# Patient Record
Sex: MALE | Race: WHITE | NOT HISPANIC OR LATINO | Employment: FULL TIME | URBAN - NONMETROPOLITAN AREA
[De-identification: names, ages, dates, MRNs, and addresses within clinical notes are randomized per-mention and may not be internally consistent; named-entity substitution may affect disease eponyms.]

---

## 2023-03-12 ENCOUNTER — HOSPITAL ENCOUNTER (EMERGENCY)
Facility: HOSPITAL | Age: 23
Discharge: HOME | End: 2023-03-12
Attending: EMERGENCY MEDICINE | Admitting: EMERGENCY MEDICINE
Payer: COMMERCIAL

## 2023-03-12 ENCOUNTER — APPOINTMENT (OUTPATIENT)
Dept: GENERAL RADIOLOGY | Facility: HOSPITAL | Age: 23
End: 2023-03-12
Payer: COMMERCIAL

## 2023-03-12 VITALS
BODY MASS INDEX: 25.18 KG/M2 | DIASTOLIC BLOOD PRESSURE: 76 MMHG | OXYGEN SATURATION: 99 % | HEART RATE: 88 BPM | TEMPERATURE: 97.4 F | RESPIRATION RATE: 16 BRPM | SYSTOLIC BLOOD PRESSURE: 123 MMHG | HEIGHT: 69 IN | WEIGHT: 170 LBS

## 2023-03-12 DIAGNOSIS — S50.12XA CONTUSION OF LEFT FOREARM, INITIAL ENCOUNTER: Primary | ICD-10-CM

## 2023-03-12 PROCEDURE — 99282 EMERGENCY DEPT VISIT SF MDM: CPT

## 2023-03-12 PROCEDURE — 73090 X-RAY EXAM OF FOREARM: CPT

## 2023-03-12 RX ORDER — ESCITALOPRAM OXALATE 10 MG/1
10 TABLET ORAL DAILY
COMMUNITY

## 2023-03-12 NOTE — ED PROVIDER NOTES
"Subjective  History of Present Illness:    Chief Complaint: Left forearm pain,  History of Present Illness: 23-year-old male presents with above complaint, states he was working on a vehicle at home yesterday when the hay dropped and the vehicle impacted his left arm and right lateral shoulder.  States he is mainly concerned about the left forearm.  No punctures no lacerations.    Nurses Notes reviewed and agree, including vitals, allergies, social history and prior medical history.     REVIEW OF SYSTEMS: All systems reviewed and not pertinent unless noted.  Review of Systems      Positive for: Left forearm pain, shoulder injury after a hay dropped while he was working on a vehicle    Negative for: Punctures lacerations deformity chest pain head injury neck pain back pain other areas of injury    History reviewed. No pertinent past medical history.    Allergies:    Patient has no known allergies.      Past Surgical History:   Procedure Laterality Date   • HAND SURGERY      Boxers fracture         Social History     Socioeconomic History   • Marital status: Single   Tobacco Use   • Smoking status: Never   Substance and Sexual Activity   • Alcohol use: No   • Drug use: No         History reviewed. No pertinent family history.    Objective  Physical Exam:  /76 (BP Location: Left arm, Patient Position: Sitting)   Pulse 88   Temp 97.4 °F (36.3 °C) (Oral)   Resp 16   Ht 175.3 cm (69\")   Wt 77.1 kg (170 lb)   SpO2 99%   BMI 25.10 kg/m²      Physical Exam    CONSTITUTIONAL: Well developed, nontoxic healthy-appearing 23-year-old male,  in no acute distress.  VITAL SIGNS: per nursing, reviewed and noted  SKIN: exposed skin with no rashes, ulcerations or petechiae  EYES: Grossly EOMI, no icterus  ENT: Normal voice.  No oropharyngeal injury RESPIRATORY:  No increased work of breathing. No retractions.   CARDIOVASCULAR:  regular rate and rhythm, no murmurs.  Good Peripheral pulses. Good cap refill to extremities. "   MUSCULOSKELETAL: Age-appropriate bulk and tone, moves all 4 extremities.  Mild soft tissue swelling over the dorsal aspect of the mid left forearm.  No punctures or lacerations there.  Superficial 3 cm abrasion over the right deltoid transverse oriented  NEUROLOGIC: Alert, oriented x 3. No gross deficits. GCS 15.   PSYCH: appropriate affect.    Procedures  No attending physician procedures were performed on this patient.  ED Course:             Lab Results (last 24 hours)     ** No results found for the last 24 hours. **           XR Forearm 2 View Left    Result Date: 3/12/2023  PROCEDURE: XR FOREARM 2 VW LEFT-  HISTORY: Crush injury mid forearm  FINDINGS:  Two views show no evidence of acute fracture or dislocation. The joint spaces appear normal. There is posterior soft tissue swelling. No foreign body is identified.      Impression: No acute bony abnormality identified.  Posterior soft tissue swelling.      This report was signed and finalized on 3/12/2023 9:54 AM by Fortunato Perez MD.         MDM    Initial impression of presenting illness: 23-year-old male presents with blunt injury, primarily left forearm pain    DDX: includes but is not limited to: Sprain strain contusion fracture    Patient arrives normotensive afebrile no tachycardia oxygen saturations 99% room air with vitals interpreted by myself.     Pertinent features from physical exam: Dorsal left forearm soft tissue swelling,.  With mild tenderness without otherwise any deformity.  Superficial abrasion over the right deltoid  Initial diagnostic plan: Plain films of the left forearm declined right shoulder x-ray    Results from initial plan were reviewed and interpreted by me revealing no acute fractures, radiologist agreed    Diagnostic information from other sources:      Interventions / Re-evaluation: None    Results/clinical rationale were discussed with patient    Consultations/Discussion of results with other physicians: None    Disposition  plan: Patient was discharged in home stable condition.  Counseled on supportive care, outpatient follow-up. Return precaution discussed.  Patient/family was understanding and agreeable with plan    -----       Final diagnoses:   Contusion of left forearm, initial encounter        Andrea Dutton,   03/12/23 1003

## 2025-03-01 ENCOUNTER — HOSPITAL ENCOUNTER (EMERGENCY)
Facility: HOSPITAL | Age: 25
Discharge: OTHER INSTITUTION | End: 2025-03-02
Attending: STUDENT IN AN ORGANIZED HEALTH CARE EDUCATION/TRAINING PROGRAM
Payer: COMMERCIAL

## 2025-03-01 ENCOUNTER — APPOINTMENT (OUTPATIENT)
Dept: CT IMAGING | Facility: HOSPITAL | Age: 25
End: 2025-03-01
Payer: COMMERCIAL

## 2025-03-01 DIAGNOSIS — K04.7 DENTAL ABSCESS: Primary | ICD-10-CM

## 2025-03-01 DIAGNOSIS — K12.2 ABSCESS OF SUBMANDIBULAR REGION: ICD-10-CM

## 2025-03-01 LAB
ALBUMIN SERPL-MCNC: 4.4 G/DL (ref 3.5–5.2)
ALBUMIN/GLOB SERPL: 1.4 G/DL
ALP SERPL-CCNC: 81 U/L (ref 39–117)
ALT SERPL W P-5'-P-CCNC: 10 U/L (ref 1–41)
ANION GAP SERPL CALCULATED.3IONS-SCNC: 16.9 MMOL/L (ref 5–15)
AST SERPL-CCNC: 15 U/L (ref 1–40)
BASOPHILS # BLD AUTO: 0.03 10*3/MM3 (ref 0–0.2)
BASOPHILS NFR BLD AUTO: 0.2 % (ref 0–1.5)
BILIRUB SERPL-MCNC: 0.4 MG/DL (ref 0–1.2)
BUN SERPL-MCNC: 6 MG/DL (ref 6–20)
BUN/CREAT SERPL: 7.3 (ref 7–25)
CALCIUM SPEC-SCNC: 9.4 MG/DL (ref 8.6–10.5)
CHLORIDE SERPL-SCNC: 98 MMOL/L (ref 98–107)
CO2 SERPL-SCNC: 21.1 MMOL/L (ref 22–29)
CREAT SERPL-MCNC: 0.82 MG/DL (ref 0.76–1.27)
CRP SERPL-MCNC: 12.48 MG/DL (ref 0–0.5)
DEPRECATED RDW RBC AUTO: 37.2 FL (ref 37–54)
EGFRCR SERPLBLD CKD-EPI 2021: 124.2 ML/MIN/1.73
EOSINOPHIL # BLD AUTO: 0.09 10*3/MM3 (ref 0–0.4)
EOSINOPHIL NFR BLD AUTO: 0.7 % (ref 0.3–6.2)
ERYTHROCYTE [DISTWIDTH] IN BLOOD BY AUTOMATED COUNT: 12.5 % (ref 12.3–15.4)
ERYTHROCYTE [SEDIMENTATION RATE] IN BLOOD: 42 MM/HR (ref 0–15)
GLOBULIN UR ELPH-MCNC: 3.1 GM/DL
GLUCOSE SERPL-MCNC: 76 MG/DL (ref 65–99)
HCT VFR BLD AUTO: 41.5 % (ref 37.5–51)
HGB BLD-MCNC: 14.3 G/DL (ref 13–17.7)
IMM GRANULOCYTES # BLD AUTO: 0.05 10*3/MM3 (ref 0–0.05)
IMM GRANULOCYTES NFR BLD AUTO: 0.4 % (ref 0–0.5)
LYMPHOCYTES # BLD AUTO: 1.9 10*3/MM3 (ref 0.7–3.1)
LYMPHOCYTES NFR BLD AUTO: 15 % (ref 19.6–45.3)
MCH RBC QN AUTO: 28 PG (ref 26.6–33)
MCHC RBC AUTO-ENTMCNC: 34.5 G/DL (ref 31.5–35.7)
MCV RBC AUTO: 81.4 FL (ref 79–97)
MONOCYTES # BLD AUTO: 1.3 10*3/MM3 (ref 0.1–0.9)
MONOCYTES NFR BLD AUTO: 10.2 % (ref 5–12)
NEUTROPHILS NFR BLD AUTO: 73.5 % (ref 42.7–76)
NEUTROPHILS NFR BLD AUTO: 9.33 10*3/MM3 (ref 1.7–7)
NRBC BLD AUTO-RTO: 0 /100 WBC (ref 0–0.2)
PLATELET # BLD AUTO: 316 10*3/MM3 (ref 140–450)
PMV BLD AUTO: 10 FL (ref 6–12)
POTASSIUM SERPL-SCNC: 3.5 MMOL/L (ref 3.5–5.2)
PROT SERPL-MCNC: 7.5 G/DL (ref 6–8.5)
RBC # BLD AUTO: 5.1 10*6/MM3 (ref 4.14–5.8)
SODIUM SERPL-SCNC: 136 MMOL/L (ref 136–145)
WBC NRBC COR # BLD AUTO: 12.7 10*3/MM3 (ref 3.4–10.8)

## 2025-03-01 PROCEDURE — 80053 COMPREHEN METABOLIC PANEL: CPT | Performed by: STUDENT IN AN ORGANIZED HEALTH CARE EDUCATION/TRAINING PROGRAM

## 2025-03-01 PROCEDURE — 85025 COMPLETE CBC W/AUTO DIFF WBC: CPT | Performed by: STUDENT IN AN ORGANIZED HEALTH CARE EDUCATION/TRAINING PROGRAM

## 2025-03-01 PROCEDURE — 86140 C-REACTIVE PROTEIN: CPT | Performed by: STUDENT IN AN ORGANIZED HEALTH CARE EDUCATION/TRAINING PROGRAM

## 2025-03-01 PROCEDURE — 70487 CT MAXILLOFACIAL W/DYE: CPT

## 2025-03-01 PROCEDURE — 85652 RBC SED RATE AUTOMATED: CPT | Performed by: STUDENT IN AN ORGANIZED HEALTH CARE EDUCATION/TRAINING PROGRAM

## 2025-03-01 PROCEDURE — 25510000001 IOPAMIDOL 61 % SOLUTION: Performed by: STUDENT IN AN ORGANIZED HEALTH CARE EDUCATION/TRAINING PROGRAM

## 2025-03-01 PROCEDURE — 99285 EMERGENCY DEPT VISIT HI MDM: CPT | Performed by: STUDENT IN AN ORGANIZED HEALTH CARE EDUCATION/TRAINING PROGRAM

## 2025-03-01 RX ORDER — IOPAMIDOL 612 MG/ML
100 INJECTION, SOLUTION INTRAVASCULAR
Status: COMPLETED | OUTPATIENT
Start: 2025-03-01 | End: 2025-03-01

## 2025-03-01 RX ORDER — SODIUM CHLORIDE 0.9 % (FLUSH) 0.9 %
10 SYRINGE (ML) INJECTION AS NEEDED
Status: DISCONTINUED | OUTPATIENT
Start: 2025-03-01 | End: 2025-03-02 | Stop reason: HOSPADM

## 2025-03-01 RX ORDER — CLINDAMYCIN PHOSPHATE 600 MG/50ML
600 INJECTION, SOLUTION INTRAVENOUS ONCE
Status: COMPLETED | OUTPATIENT
Start: 2025-03-02 | End: 2025-03-02

## 2025-03-01 RX ADMIN — IOPAMIDOL 100 ML: 612 INJECTION, SOLUTION INTRAVENOUS at 22:44

## 2025-03-02 VITALS
OXYGEN SATURATION: 98 % | TEMPERATURE: 98.1 F | WEIGHT: 158 LBS | DIASTOLIC BLOOD PRESSURE: 79 MMHG | BODY MASS INDEX: 23.4 KG/M2 | HEART RATE: 101 BPM | SYSTOLIC BLOOD PRESSURE: 117 MMHG | RESPIRATION RATE: 18 BRPM | HEIGHT: 69 IN

## 2025-03-02 PROCEDURE — 25010000002 CLINDAMYCIN 600 MG/50ML SOLUTION: Performed by: STUDENT IN AN ORGANIZED HEALTH CARE EDUCATION/TRAINING PROGRAM

## 2025-03-02 PROCEDURE — 36415 COLL VENOUS BLD VENIPUNCTURE: CPT

## 2025-03-02 PROCEDURE — 96365 THER/PROPH/DIAG IV INF INIT: CPT

## 2025-03-02 PROCEDURE — 87040 BLOOD CULTURE FOR BACTERIA: CPT | Performed by: STUDENT IN AN ORGANIZED HEALTH CARE EDUCATION/TRAINING PROGRAM

## 2025-03-02 RX ADMIN — CLINDAMYCIN PHOSPHATE 600 MG: 600 INJECTION, SOLUTION INTRAVENOUS at 00:26

## 2025-03-02 NOTE — ED NOTES
Pt left via EMS en route to [facility] ER approved by provider. Pt is a&o x4 and is in stable condition. Pt and family had no further questions following transfer.

## 2025-03-02 NOTE — ED PROVIDER NOTES
[facility] EMERGENCY DEPARTMENT  Emergency Department Encounter  Emergency Medicine Physician Note       Pt Name: Toni Alvarez  MRN: [id number]  Pt :   2000  Room Number:    Date of encounter:  3/1/2025  PCP: Provider, No Known  ED Provider: Duran Guaman MD    Historian: Patient      HPI:  Chief Complaint: Jaw pain        Context: Toni Alvarez is a 25 y.o. male who presents to the ED c/o jaw pain/swelling.  Patient had wisdom teeth extracted around 1 week ago.  Was seen by operating dental surgeon yesterday and started on Augmentin due to left-sided jaw swelling.  States swelling is getting worse and he can no longer open his mouth more than half an inch.        PAST MEDICAL HISTORY  History reviewed. No pertinent past medical history.      PAST SURGICAL HISTORY  Past Surgical History:   Procedure Laterality Date    HAND SURGERY      Boxers fracture         FAMILY HISTORY  History reviewed. No pertinent family history.      SOCIAL HISTORY  Social History     Socioeconomic History    Marital status: Single   Tobacco Use    Smoking status: Never   Substance and Sexual Activity    Alcohol use: No    Drug use: No         ALLERGIES  Patient has no known allergies.        REVIEW OF SYSTEMS  Review of Systems     All systems reviewed and negative except for those discussed in HPI.       PHYSICAL EXAM    I have reviewed the triage vital signs and nursing notes.    ED Triage Vitals [25]   Temp Heart Rate Resp BP SpO2   98.6 °F (37 °C) 113 16 128/53 99 %      Temp src Heart Rate Source Patient Position BP Location FiO2 (%)   Oral Monitor Sitting Left arm --       Physical Exam    General:  Awake, alert, no acute distress  HEENT: Atraumatic, normocephalic, EOMI, PERRLA, mucous membranes moist.  Significant trismus with swelling of left posterior mandible.  Palpable swelling under left mandible into neck with indurated tissue but no external erythema  NECK:   Supple, atraumatic  Cardiovascular: Tachycardic, regular rhythm, no murmurs, rubs, or gallops.  Extremities well perfused   Respiratory:  Regular rate, clear lungs to auscultation bilaterally.  No rhonchi, rales, wheezing  Abdominal:  Soft, nondistended, nontender.  No guarding or rebound.  No palpable masses  Extremity:  No visible bony abnormalities in all 4 extremities.  Full range of motion of all extremities.  Skin:  Warm and dry.  No rashes  Neuro:  AAOx3, GCS 15. Cranial nerves 2-12 grossly intact.  No focal strength or sensation deficits.  Psych:  Mood and affect appropriate.        LAB RESULTS  Recent Results (from the past 24 hours)   Comprehensive Metabolic Panel    Collection Time: 03/01/25 10:24 PM    Specimen: Blood   Result Value Ref Range    Glucose 76 65 - 99 mg/dL    BUN 6 6 - 20 mg/dL    Creatinine 0.82 0.76 - 1.27 mg/dL    Sodium 136 136 - 145 mmol/L    Potassium 3.5 3.5 - 5.2 mmol/L    Chloride 98 98 - 107 mmol/L    CO2 21.1 (L) 22.0 - 29.0 mmol/L    Calcium 9.4 8.6 - 10.5 mg/dL    Total Protein 7.5 6.0 - 8.5 g/dL    Albumin 4.4 3.5 - 5.2 g/dL    ALT (SGPT) 10 1 - 41 U/L    AST (SGOT) 15 1 - 40 U/L    Alkaline Phosphatase 81 39 - 117 U/L    Total Bilirubin 0.4 0.0 - 1.2 mg/dL    Globulin 3.1 gm/dL    A/G Ratio 1.4 g/dL    BUN/Creatinine Ratio 7.3 7.0 - 25.0    Anion Gap 16.9 (H) 5.0 - 15.0 mmol/L    eGFR 124.2 >60.0 mL/min/1.73   C-reactive Protein    Collection Time: 03/01/25 10:24 PM    Specimen: Blood   Result Value Ref Range    C-Reactive Protein 12.48 (H) 0.00 - 0.50 mg/dL   Sedimentation Rate    Collection Time: 03/01/25 10:24 PM    Specimen: Blood   Result Value Ref Range    Sed Rate 42 (H) 0 - 15 mm/hr   CBC Auto Differential    Collection Time: 03/01/25 10:24 PM    Specimen: Blood   Result Value Ref Range    WBC 12.70 (H) 3.40 - 10.80 10*3/mm3    RBC 5.10 4.14 - 5.80 10*6/mm3    Hemoglobin 14.3 13.0 - 17.7 g/dL    Hematocrit 41.5 37.5 - 51.0 %    MCV 81.4 79.0 - 97.0 fL    MCH 28.0 26.6  - 33.0 pg    MCHC 34.5 31.5 - 35.7 g/dL    RDW 12.5 12.3 - 15.4 %    RDW-SD 37.2 37.0 - 54.0 fl    MPV 10.0 6.0 - 12.0 fL    Platelets 316 140 - 450 10*3/mm3    Neutrophil % 73.5 42.7 - 76.0 %    Lymphocyte % 15.0 (L) 19.6 - 45.3 %    Monocyte % 10.2 5.0 - 12.0 %    Eosinophil % 0.7 0.3 - 6.2 %    Basophil % 0.2 0.0 - 1.5 %    Immature Grans % 0.4 0.0 - 0.5 %    Neutrophils, Absolute 9.33 (H) 1.70 - 7.00 10*3/mm3    Lymphocytes, Absolute 1.90 0.70 - 3.10 10*3/mm3    Monocytes, Absolute 1.30 (H) 0.10 - 0.90 10*3/mm3    Eosinophils, Absolute 0.09 0.00 - 0.40 10*3/mm3    Basophils, Absolute 0.03 0.00 - 0.20 10*3/mm3    Immature Grans, Absolute 0.05 0.00 - 0.05 10*3/mm3    nRBC 0.0 0.0 - 0.2 /100 WBC       If labs were ordered, I independently evaluated the results and considered them in treating the patient.        RADIOLOGY  CT Facial Bones With Contrast    Addendum Date: 3/1/2025    ADDENDUM REPORT ADDENDUM: Receipt of this report by the clinical staff was confirmed with  on Mar 01, 2025 23:52:00 EST. Authenticated and Electronically Signed by Juan José Mckay DO on 03/01/2025 11:52:41 PM    Result Date: 3/1/2025  FINAL REPORT TECHNIQUE: null CLINICAL HISTORY: Recent wisdom teeth extraction, significant left jaw swelling onto neck COMPARISON: null FINDINGS: CT soft tissue neck with contrast Comparison: None Findings: Maxillary/mandibular wisdom teeth extraction sites. Located just inferior to the left angle of the mandible is an infectious fluid collection most consistent with abscess with largest component measuring 3.0 x 2.5 x 2.8 cm. The abscess demonstrates thickened enhancing periphery with multiple internal septations. Recommend drainage and culture. The abscesses likely odontogenic in origin. The abscess is located of the lateral margin of the left submandibular gland resulting in mass effect upon the gland. Swelling throughout the left cheek and extending inferiorly to the left mid/lower neck.  Reactive thickening of the left platysmas musculature. Finding likely represents associated cellulitis of the left cheek and left neck. Small lymph nodes adjacent to the anterior margin of the left submandibular gland measuring up to 9 mm in long axis and are likely reactive. Pharyngeal mucosal space, parapharyngeal fat, prevertebral tissues, and epiglottis are within normal limits. Salivary glands are unremarkable. No sialoliths. The visualized intracranial contents are unremarkable. Thyroid gland is unremarkable. No consolidation at the lung apices. No acute fracture or dislocation. Left mastoid effusion. Minimal mucosal thickening of the right maxillary sinus. A small polyp mucous retention cysts of the left maxillary sinus.     IMPRESSION: 1. Loculated/septated abscess just inferior to the left angle of the mandible measuring 3.0 x 2.5 x 2.8 cm which is likely odontogenic in origin. Recommend drainage and culture. The abscess resulting in local mass effect especially upon the left submandibular gland. 2. Edema and swelling within the left cheek and extending into the mid/distal aspect of the left neck with reactive platysmas thickening. Finding likely represents cellulitis. 3. Maxillary/mandibular wisdom teeth extraction sites. 4. Left mastoid effusion. Authenticated and Electronically Signed by Juan José Mckay DO on 03/01/2025 11:48:42 PM     I ordered and independently evaluated the above noted radiographic studies.     See radiologist's dictation for official interpretation.        PROCEDURES    Procedures    No orders to display       MEDICATIONS GIVEN IN ER    Medications   sodium chloride 0.9 % flush 10 mL (has no administration in time range)   iopamidol (ISOVUE-300) 61 % injection 100 mL (100 mL Intravenous Given 3/1/25 3834)   clindamycin (CLEOCIN) 600 mg in dextrose 5% 50 mL IVPB (premix) (0 mg Intravenous Stopped 3/2/25 0057)         MEDICAL DECISION MAKING, PROGRESS, and CONSULTS    All labs, if  obtained, have been independently reviewed by me.  All radiology studies, if obtained, have been evaluated by me and the radiologist dictating the report.  All EKG's, if obtained, have been independently viewed and interpreted by me.      Discussion below represents my analysis of pertinent findings related to patient's condition, differential diagnosis, treatment plan and final disposition.    Toni Alvarez is a 25 y.o. male who presents to the ED c/o jaw pain/swelling.  Patient has significant swelling of left side of jaw extending underneath the mandible.  Concern for Shlomo's angina or postoperative abscess from wisdom teeth extraction.  Labs obtained along with CT of face with contrast.    Labs personally interpreted showing leukocytosis of 12.7 along with elevated ESR of 42 and CRP of 12.48.    CT of face with contrast personally visualized and shows loculated septated abscess just inferior to the left angle of the mandible measuring 3 cm x 2.5 x 2.8 cm, along with local mass effect especially along the left submandibular gland and edema/swelling and platysmas thickening.    Patient to be started on IV clindamycin.  Contacted transfer center at  and spoke with Dr. Valencia, who accepted patient as an ER to ER transfer for further treatment.    Extensively discussed results with patient along with plan of care for transfer.  Patient agreeable with this plan and to be transferred to  ER for further treatment.                               Orders placed during this visit:  Orders Placed This Encounter   Procedures    Blood Culture With ELVIS - Blood,    Blood Culture With ELVIS - Blood,    CT Facial Bones With Contrast    Comprehensive Metabolic Panel    C-reactive Protein    Sedimentation Rate    CBC Auto Differential    Insert Peripheral IV    CBC & Differential         ED Course:                  Shared Decision Making:  After my consideration of clinical presentation and any laboratory/radiology studies  obtained, I discussed the findings with the patient/patient representative who is in agreement with the treatment plan and the final disposition.   Risks and benefits of discharge and/or observation/admission were discussed.      AS OF 02:03 EST VITALS:    BP - 117/79  HR - 101  TEMP - 98.1 °F (36.7 °C) (Oral)  O2 SATS - 98%                  DIAGNOSIS  Final diagnoses:   Dental abscess   Abscess of submandibular region         DISPOSITION  Transfer      Please note that portions of this document were completed with voice recognition software.        Duran Guaman MD  03/02/25 0201

## 2025-03-07 LAB
BACTERIA SPEC AEROBE CULT: NORMAL
BACTERIA SPEC AEROBE CULT: NORMAL

## 2025-06-30 ENCOUNTER — OFFICE VISIT (OUTPATIENT)
Dept: INTERNAL MEDICINE | Facility: CLINIC | Age: 25
End: 2025-06-30
Payer: COMMERCIAL

## 2025-06-30 VITALS
TEMPERATURE: 97.7 F | OXYGEN SATURATION: 98 % | HEART RATE: 85 BPM | SYSTOLIC BLOOD PRESSURE: 120 MMHG | BODY MASS INDEX: 24.29 KG/M2 | HEIGHT: 69 IN | WEIGHT: 164 LBS | DIASTOLIC BLOOD PRESSURE: 62 MMHG

## 2025-06-30 DIAGNOSIS — Z00.00 ANNUAL PHYSICAL EXAM: Primary | ICD-10-CM

## 2025-06-30 DIAGNOSIS — Z76.89 ENCOUNTER TO ESTABLISH CARE: ICD-10-CM

## 2025-06-30 DIAGNOSIS — Z23 NEED FOR TDAP VACCINATION: ICD-10-CM

## 2025-06-30 DIAGNOSIS — F90.2 ATTENTION DEFICIT HYPERACTIVITY DISORDER (ADHD), COMBINED TYPE: ICD-10-CM

## 2025-06-30 DIAGNOSIS — D22.9 MULTIPLE NEVI: ICD-10-CM

## 2025-07-01 NOTE — PROGRESS NOTES
Male Physical Note      Date: 2025   Patient Name: Toni Alvarez  : 2000   MRN: [id number]     Chief Complaint:    Chief Complaint   Patient presents with    Establish Care     With [facility] today.     Annual Exam    ADHD       History of Present Illness: Toni Alvarez is a 25 y.o. male who is here today to establish care, annual health maintenance and physical.  Hx of ADHD diagnosed as child, was on Focalin until 2013. Having issues now as an adult. Interested in getting treatment.   Estab with optometrist, wears glasses. Needs to schedule with dentist and dermatology. Eats balanced diet, active. BMI normal. Quit dipping in . Drinks 3 beers per week.   Needs tdap      2025   ADHD Adult Screener    1. How often do you have trouble wrapping up the final details of a project, once the challenging parts have been done? Very Often    2. How often do you have difficulty getting things in order when you have to do a task that requires organization? Often    4. When you have a task that requires a lot of thought, how often do you avoid or delay getting started ? Sometimes    5. How often do you fidget or squirm with your hands or feet when you have to sit down for a long time? Very Often    6. How often do you feel overly active and compelled to do things, like you were driven by a motor Often    ADHD Adult [place] Check    1. How often do you have trouble wrapping up the final details of a project, once the challenging parts have been done? Very Often    2. How often do you have difficulty getting things in order when you have to do a task that requires organization? Sometimes    3. How often do you have problems remembering appointments or obligations  Very Often    4. When you have a task that requires a lot of thought, how often do you avoid or delay getting started ? Sometimes    5. How often do you fidget or squirm with your hands or feet when you have to sit down for a long  time? Very Often    6. How often do you feel overly active and compelled to do things, like you were driven by a motor? Often    7. How often do you make careless mistakes when you have to work on a boring or difficult project? Often    8. How often do have difficulty keeping your attention when you are doing boring or repetitive work? Very Often    9. How often do you have difficulty concentrating on what people say to you, even when they are speaking to you Very Often    10.How often do you misplace or have difficulty finding things at home or at work? Very Often    11.How often are you distracted by activity or noise around you? Often    12.How often do you leave your seat in meetings or other situations in which you are expected to remain seated? Never    13.How often do you feel restless or fidgety? Very Often    14.How often do you have difficulty unwinding and relaxing when you have time to yourself? Never    15.How often do you find yourself talking too much when you are in social situations? Often    16.When you’re in a conversation, how often do you find yourself finishing the sentences of the people you are talking to, before they can finish them themselves? Often    17.How often do you have difficulty waiting your turn in situations when turn taking is required? Rarely    18.How often do you interrupt others when they are busy? Never        Subjective      Review of Systems:   Review of Systems   Psychiatric/Behavioral:  Positive for decreased concentration.    All other systems reviewed and are negative.    Past Medical History, Social History, Family History and Care Team were all reviewed with patient and updated as appropriate.     Medications:   No current outpatient medications on file.    Allergies:   No Known Allergies    Immunizations:  Immunization History   Administered Date(s) Administered    COVID-19 (MODERNA) 1st,2nd,3rd Dose Monovalent 01/03/2022    Tdap 06/30/2025        Tobacco Use:  "Unknown (6/30/2025)    Patient History     Smoking Tobacco Use: Never     Smokeless Tobacco Use: Unknown     Passive Exposure: Not on file       Social History     Substance and Sexual Activity   Alcohol Use Yes    Alcohol/week: 3.0 standard drinks of alcohol    Types: 3 Cans of beer per week        Social History     Substance and Sexual Activity   Drug Use Never        PHQ-9 Depression Screening  Little interest or pleasure in doing things? Several days   Feeling down, depressed, or hopeless? Several days   PHQ-2 Total Score 2   Trouble falling or staying asleep, or sleeping too much? More than half the days   Feeling tired or having little energy? More than half the days   Poor appetite or overeating? Not at all   Feeling bad about yourself - or that you are a failure or have let yourself or your family down? Not at all   Trouble concentrating on things, such as reading the newspaper or watching television? Nearly every day   Moving or speaking so slowly that other people could have noticed? Or the opposite - being so fidgety or restless that you have been moving around a lot more than usual? Nearly every day     Thoughts that you would be better off dead, or of hurting yourself in some way? Not at all   PHQ-9 Total Score 12   If you checked off any problems, how difficult have these problems made it for you to do your work, take care of things at home, or get along with other people? Somewhat difficult           Objective     Vital Signs:   Vitals:    06/30/25 1605   BP: 120/62   Pulse: 85   Temp: 97.7 °F (36.5 °C)   SpO2: 98%   Weight: 74.4 kg (164 lb)   Height: 175.3 cm (69\")   PainSc: 0-No pain     Body mass index is 24.22 kg/m².   BMI is within normal parameters. No other follow-up for BMI required.     Physical Exam:  General: well-developed, well-nourished, in no acute distress, stated age  HEENT: normocephalic, atraumatic.Glasses on. PERRLA. EOM intact. TM clear bilaterally. Pink mucosa. Oropharynx " clear  Neck: supple, no lymphadenopathy, thyromegaly, or masses. Normal ROM  CV: RRR. No murmurs, rubs, gallops. No peripheral edema or varicosities   Respiratory: Clear bilaterally. No wheezes, rales or rhonchi.   Abdomen: soft, nontender. Nohepatosplenomegaly. Bowel sounds normal.  Musculoskeletal: ROM normal. No obvious joint swelling or deformity.   Neuro: A&O x 2. Cranial nerves II-XII intact. No focal deficits.  Skin: warm, dry, without obvious rashes. Multiple benign appearing nevi to face, arms, back. One nevi flat upper left back with small dark center.   Psych: Normal mood and affect. Thought process coherent.       Assessment / Plan      Assessment/Plan:   Diagnoses and all orders for this visit:    1. Annual physical exam (Primary)    2. Encounter to establish care    3. Multiple nevi    4. Attention deficit hyperactivity disorder (ADHD), combined type    5. Need for Tdap vaccination  -     Tdap Vaccine => 6yo IM (BOOSTRIX/ADACEL)       Plan:  List of local psychiatrists provided to call/schedule to establish care for ADHD, interested in pharmacological treatment with stimulants. Use to take Focalin for many years.   Recommend schedule an appointment with dermatology for skin assessment. Has a flat nevi on upper left back with small black spec near center; other nevi appear normal.   We discussed the risks and benefits of Boostrix (Tdap). Counseling was given to inform of the potential signs and symptoms of adverse effects and when to seek medical attention. The CDC Vaccination Information Sheet (VIS) was given for review prior to administration.  A copy of the VIS was also offered.    He declines labs     Healthcare Maintenance:  Counseling provided based on age appropriate USPSTF guidelines.  Encouraged 150 minutes of exercise total per week for heart health   Recommend balanced diet  Avoid caffeine, nicotine/tobacco, alcohol, illicit substances   Dental visits twice per year, yearly eye exams, yearly  skin assessments with dermatology   BMI is within normal parameters. No other follow-up for BMI required.    Toni Alvarez voices understanding and acceptance of this advice and will call back with any further questions or concerns. AVS with preventive healthcare tips printed for patient.       Follow Up:   Return in about 1 year (around 6/30/2026) for Annual Physical.      BERNICE John  [facility]

## 2025-07-11 ENCOUNTER — PATIENT ROUNDING (BHMG ONLY) (OUTPATIENT)
Dept: INTERNAL MEDICINE | Facility: CLINIC | Age: 25
End: 2025-07-11
Payer: COMMERCIAL

## 2025-07-11 NOTE — PROGRESS NOTES
A [employer] message has been sent to patient for [place] [facility] [place]